# Patient Record
Sex: FEMALE | Race: OTHER | Employment: PART TIME | URBAN - METROPOLITAN AREA
[De-identification: names, ages, dates, MRNs, and addresses within clinical notes are randomized per-mention and may not be internally consistent; named-entity substitution may affect disease eponyms.]

---

## 2024-09-23 ENCOUNTER — APPOINTMENT (OUTPATIENT)
Dept: CT IMAGING | Age: 54
DRG: 390 | End: 2024-09-23
Payer: MEDICAID

## 2024-09-23 ENCOUNTER — HOSPITAL ENCOUNTER (INPATIENT)
Age: 54
LOS: 1 days | Discharge: HOME | DRG: 390 | End: 2024-09-25
Attending: STUDENT IN AN ORGANIZED HEALTH CARE EDUCATION/TRAINING PROGRAM | Admitting: STUDENT IN AN ORGANIZED HEALTH CARE EDUCATION/TRAINING PROGRAM
Payer: MEDICAID

## 2024-09-23 DIAGNOSIS — K52.9 ILEITIS: ICD-10-CM

## 2024-09-23 DIAGNOSIS — K56.609 SMALL BOWEL OBSTRUCTION (HCC): Primary | ICD-10-CM

## 2024-09-23 LAB
ALBUMIN SERPL-MCNC: 4.6 G/DL (ref 3.4–5)
ALBUMIN/GLOB SERPL: 1.4 {RATIO} (ref 1.1–2.2)
ALP SERPL-CCNC: 121 U/L (ref 40–129)
ALT SERPL-CCNC: 17 U/L (ref 10–40)
ANION GAP SERPL CALCULATED.3IONS-SCNC: 13 MMOL/L (ref 3–16)
AST SERPL-CCNC: 21 U/L (ref 15–37)
BACTERIA URNS QL MICRO: NORMAL /HPF
BASOPHILS # BLD: 0 K/UL (ref 0–0.2)
BASOPHILS NFR BLD: 0 %
BILIRUB SERPL-MCNC: 0.3 MG/DL (ref 0–1)
BILIRUB UR QL STRIP.AUTO: NEGATIVE
BUN SERPL-MCNC: 12 MG/DL (ref 7–20)
CALCIUM SERPL-MCNC: 9.6 MG/DL (ref 8.3–10.6)
CHLORIDE SERPL-SCNC: 105 MMOL/L (ref 99–110)
CLARITY UR: CLEAR
CO2 SERPL-SCNC: 25 MMOL/L (ref 21–32)
COLOR UR: YELLOW
CREAT SERPL-MCNC: <0.5 MG/DL (ref 0.6–1.1)
DEPRECATED RDW RBC AUTO: 12.7 % (ref 12.4–15.4)
EOSINOPHIL # BLD: 0 K/UL (ref 0–0.6)
EOSINOPHIL NFR BLD: 0 %
EPI CELLS #/AREA URNS AUTO: 0 /HPF (ref 0–5)
GFR SERPLBLD CREATININE-BSD FMLA CKD-EPI: >90 ML/MIN/{1.73_M2}
GLUCOSE SERPL-MCNC: 125 MG/DL (ref 70–99)
GLUCOSE UR STRIP.AUTO-MCNC: NEGATIVE MG/DL
HCG SERPL QL: NEGATIVE
HCT VFR BLD AUTO: 39.6 % (ref 36–48)
HGB BLD-MCNC: 13.7 G/DL (ref 12–16)
HGB UR QL STRIP.AUTO: NEGATIVE
HYALINE CASTS #/AREA URNS AUTO: 0 /LPF (ref 0–8)
KETONES UR STRIP.AUTO-MCNC: NEGATIVE MG/DL
LACTATE BLDV-SCNC: 1.4 MMOL/L (ref 0.4–1.9)
LEUKOCYTE ESTERASE UR QL STRIP.AUTO: ABNORMAL
LIPASE SERPL-CCNC: 28 U/L (ref 13–60)
LYMPHOCYTES # BLD: 5.4 K/UL (ref 1–5.1)
LYMPHOCYTES NFR BLD: 55 %
MCH RBC QN AUTO: 29.1 PG (ref 26–34)
MCHC RBC AUTO-ENTMCNC: 34.6 G/DL (ref 31–36)
MCV RBC AUTO: 83.9 FL (ref 80–100)
MONOCYTES # BLD: 0 K/UL (ref 0–1.3)
MONOCYTES NFR BLD: 0 %
NEUTROPHILS # BLD: 4.4 K/UL (ref 1.7–7.7)
NEUTROPHILS NFR BLD: 45 %
NITRITE UR QL STRIP.AUTO: NEGATIVE
PATH INTERP BLD-IMP: YES
PH UR STRIP.AUTO: 8 [PH] (ref 5–8)
PLATELET # BLD AUTO: 297 K/UL (ref 135–450)
PLATELET BLD QL SMEAR: ADEQUATE
PMV BLD AUTO: 7.9 FL (ref 5–10.5)
POTASSIUM SERPL-SCNC: 3.7 MMOL/L (ref 3.5–5.1)
PROT SERPL-MCNC: 8 G/DL (ref 6.4–8.2)
PROT UR STRIP.AUTO-MCNC: NEGATIVE MG/DL
RBC # BLD AUTO: 4.71 M/UL (ref 4–5.2)
RBC CLUMPS #/AREA URNS AUTO: 0 /HPF (ref 0–4)
RBC MORPH BLD: NORMAL
SLIDE REVIEW: ABNORMAL
SODIUM SERPL-SCNC: 143 MMOL/L (ref 136–145)
SP GR UR STRIP.AUTO: <=1.005 (ref 1–1.03)
UA COMPLETE W REFLEX CULTURE PNL UR: ABNORMAL
UA DIPSTICK W REFLEX MICRO PNL UR: YES
URN SPEC COLLECT METH UR: ABNORMAL
UROBILINOGEN UR STRIP-ACNC: 0.2 E.U./DL
WBC # BLD AUTO: 9.8 K/UL (ref 4–11)
WBC #/AREA URNS AUTO: 1 /HPF (ref 0–5)

## 2024-09-23 PROCEDURE — 81001 URINALYSIS AUTO W/SCOPE: CPT

## 2024-09-23 PROCEDURE — 2580000003 HC RX 258: Performed by: PHYSICIAN ASSISTANT

## 2024-09-23 PROCEDURE — 99285 EMERGENCY DEPT VISIT HI MDM: CPT

## 2024-09-23 PROCEDURE — 96375 TX/PRO/DX INJ NEW DRUG ADDON: CPT

## 2024-09-23 PROCEDURE — 6360000002 HC RX W HCPCS: Performed by: PHYSICIAN ASSISTANT

## 2024-09-23 PROCEDURE — 84703 CHORIONIC GONADOTROPIN ASSAY: CPT

## 2024-09-23 PROCEDURE — 36415 COLL VENOUS BLD VENIPUNCTURE: CPT

## 2024-09-23 PROCEDURE — 6360000004 HC RX CONTRAST MEDICATION: Performed by: STUDENT IN AN ORGANIZED HEALTH CARE EDUCATION/TRAINING PROGRAM

## 2024-09-23 PROCEDURE — 83605 ASSAY OF LACTIC ACID: CPT

## 2024-09-23 PROCEDURE — 74177 CT ABD & PELVIS W/CONTRAST: CPT

## 2024-09-23 PROCEDURE — 85025 COMPLETE CBC W/AUTO DIFF WBC: CPT

## 2024-09-23 PROCEDURE — 83690 ASSAY OF LIPASE: CPT

## 2024-09-23 PROCEDURE — 80053 COMPREHEN METABOLIC PANEL: CPT

## 2024-09-23 PROCEDURE — 96374 THER/PROPH/DIAG INJ IV PUSH: CPT

## 2024-09-23 PROCEDURE — 96361 HYDRATE IV INFUSION ADD-ON: CPT

## 2024-09-23 RX ORDER — IOPAMIDOL 755 MG/ML
75 INJECTION, SOLUTION INTRAVASCULAR
Status: COMPLETED | OUTPATIENT
Start: 2024-09-23 | End: 2024-09-23

## 2024-09-23 RX ORDER — CYCLOBENZAPRINE HCL 10 MG
5 TABLET ORAL NIGHTLY PRN
Status: ON HOLD | COMMUNITY
Start: 2024-08-20 | End: 2024-09-25 | Stop reason: HOSPADM

## 2024-09-23 RX ORDER — NAPROXEN SODIUM 220 MG
220 TABLET ORAL 2 TIMES DAILY WITH MEALS
Status: ON HOLD | COMMUNITY
End: 2024-09-25 | Stop reason: HOSPADM

## 2024-09-23 RX ORDER — MORPHINE SULFATE 4 MG/ML
4 INJECTION, SOLUTION INTRAMUSCULAR; INTRAVENOUS EVERY 30 MIN PRN
Status: DISCONTINUED | OUTPATIENT
Start: 2024-09-23 | End: 2024-09-24 | Stop reason: HOSPADM

## 2024-09-23 RX ORDER — ESTRADIOL 0.1 MG/G
CREAM VAGINAL
COMMUNITY
Start: 2024-05-28

## 2024-09-23 RX ORDER — KETOROLAC TROMETHAMINE 15 MG/ML
15 INJECTION, SOLUTION INTRAMUSCULAR; INTRAVENOUS ONCE
Status: COMPLETED | OUTPATIENT
Start: 2024-09-23 | End: 2024-09-23

## 2024-09-23 RX ORDER — OLOPATADINE HYDROCHLORIDE 2 MG/ML
1 SOLUTION/ DROPS OPHTHALMIC 2 TIMES DAILY
COMMUNITY
Start: 2022-06-24

## 2024-09-23 RX ORDER — ONDANSETRON 2 MG/ML
4 INJECTION INTRAMUSCULAR; INTRAVENOUS EVERY 6 HOURS PRN
Status: DISCONTINUED | OUTPATIENT
Start: 2024-09-23 | End: 2024-09-24 | Stop reason: HOSPADM

## 2024-09-23 RX ORDER — 0.9 % SODIUM CHLORIDE 0.9 %
1000 INTRAVENOUS SOLUTION INTRAVENOUS ONCE
Status: COMPLETED | OUTPATIENT
Start: 2024-09-23 | End: 2024-09-23

## 2024-09-23 RX ADMIN — SODIUM CHLORIDE 1000 ML: 9 INJECTION, SOLUTION INTRAVENOUS at 22:19

## 2024-09-23 RX ADMIN — ONDANSETRON 4 MG: 2 INJECTION, SOLUTION INTRAMUSCULAR; INTRAVENOUS at 22:18

## 2024-09-23 RX ADMIN — KETOROLAC TROMETHAMINE 15 MG: 15 INJECTION, SOLUTION INTRAMUSCULAR; INTRAVENOUS at 22:18

## 2024-09-23 RX ADMIN — IOPAMIDOL 75 ML: 755 INJECTION, SOLUTION INTRAVENOUS at 22:59

## 2024-09-23 ASSESSMENT — ENCOUNTER SYMPTOMS
DIARRHEA: 1
ABDOMINAL PAIN: 1
COUGH: 0
VOMITING: 0
RHINORRHEA: 0
NAUSEA: 1
SHORTNESS OF BREATH: 0

## 2024-09-23 ASSESSMENT — LIFESTYLE VARIABLES
HOW OFTEN DO YOU HAVE A DRINK CONTAINING ALCOHOL: NEVER
HOW MANY STANDARD DRINKS CONTAINING ALCOHOL DO YOU HAVE ON A TYPICAL DAY: PATIENT DOES NOT DRINK

## 2024-09-23 ASSESSMENT — PAIN SCALES - GENERAL
PAINLEVEL_OUTOF10: 6
PAINLEVEL_OUTOF10: 10
PAINLEVEL_OUTOF10: 10

## 2024-09-23 ASSESSMENT — PAIN DESCRIPTION - LOCATION: LOCATION: ABDOMEN

## 2024-09-23 ASSESSMENT — PAIN - FUNCTIONAL ASSESSMENT: PAIN_FUNCTIONAL_ASSESSMENT: 0-10

## 2024-09-24 ENCOUNTER — APPOINTMENT (OUTPATIENT)
Dept: GENERAL RADIOLOGY | Age: 54
DRG: 390 | End: 2024-09-24
Payer: MEDICAID

## 2024-09-24 PROBLEM — R10.9 ABDOMINAL PAIN: Status: ACTIVE | Noted: 2024-09-24

## 2024-09-24 PROBLEM — K56.609 SMALL BOWEL OBSTRUCTION (HCC): Status: ACTIVE | Noted: 2024-09-24

## 2024-09-24 LAB
ANION GAP SERPL CALCULATED.3IONS-SCNC: 11 MMOL/L (ref 3–16)
BUN SERPL-MCNC: 8 MG/DL (ref 7–20)
CALCIUM SERPL-MCNC: 8.6 MG/DL (ref 8.3–10.6)
CHLORIDE SERPL-SCNC: 106 MMOL/L (ref 99–110)
CO2 SERPL-SCNC: 23 MMOL/L (ref 21–32)
CREAT SERPL-MCNC: <0.5 MG/DL (ref 0.6–1.1)
GFR SERPLBLD CREATININE-BSD FMLA CKD-EPI: >90 ML/MIN/{1.73_M2}
GLUCOSE SERPL-MCNC: 148 MG/DL (ref 70–99)
PATH INTERP BLD-IMP: NORMAL
POTASSIUM SERPL-SCNC: 3.8 MMOL/L (ref 3.5–5.1)
SODIUM SERPL-SCNC: 140 MMOL/L (ref 136–145)

## 2024-09-24 PROCEDURE — 6370000000 HC RX 637 (ALT 250 FOR IP): Performed by: STUDENT IN AN ORGANIZED HEALTH CARE EDUCATION/TRAINING PROGRAM

## 2024-09-24 PROCEDURE — 1200000000 HC SEMI PRIVATE

## 2024-09-24 PROCEDURE — 6360000004 HC RX CONTRAST MEDICATION

## 2024-09-24 PROCEDURE — 2580000003 HC RX 258: Performed by: STUDENT IN AN ORGANIZED HEALTH CARE EDUCATION/TRAINING PROGRAM

## 2024-09-24 PROCEDURE — 99222 1ST HOSP IP/OBS MODERATE 55: CPT | Performed by: SURGERY

## 2024-09-24 PROCEDURE — 6360000002 HC RX W HCPCS: Performed by: PHYSICIAN ASSISTANT

## 2024-09-24 PROCEDURE — APPNB30 APP NON BILLABLE TIME 0-30 MINS: Performed by: NURSE PRACTITIONER

## 2024-09-24 PROCEDURE — 6360000002 HC RX W HCPCS: Performed by: STUDENT IN AN ORGANIZED HEALTH CARE EDUCATION/TRAINING PROGRAM

## 2024-09-24 PROCEDURE — 6360000002 HC RX W HCPCS: Performed by: SURGERY

## 2024-09-24 PROCEDURE — 36415 COLL VENOUS BLD VENIPUNCTURE: CPT

## 2024-09-24 PROCEDURE — 80048 BASIC METABOLIC PNL TOTAL CA: CPT

## 2024-09-24 PROCEDURE — 74250 X-RAY XM SM INT 1CNTRST STD: CPT

## 2024-09-24 PROCEDURE — 74018 RADEX ABDOMEN 1 VIEW: CPT

## 2024-09-24 RX ORDER — MAGNESIUM SULFATE IN WATER 40 MG/ML
2000 INJECTION, SOLUTION INTRAVENOUS PRN
Status: DISCONTINUED | OUTPATIENT
Start: 2024-09-24 | End: 2024-09-25 | Stop reason: HOSPADM

## 2024-09-24 RX ORDER — ONDANSETRON 4 MG/1
4 TABLET, ORALLY DISINTEGRATING ORAL EVERY 8 HOURS PRN
Status: DISCONTINUED | OUTPATIENT
Start: 2024-09-24 | End: 2024-09-25 | Stop reason: HOSPADM

## 2024-09-24 RX ORDER — SODIUM CHLORIDE 9 MG/ML
INJECTION, SOLUTION INTRAVENOUS CONTINUOUS
Status: DISCONTINUED | OUTPATIENT
Start: 2024-09-24 | End: 2024-09-25 | Stop reason: HOSPADM

## 2024-09-24 RX ORDER — ACETAMINOPHEN 325 MG/1
650 TABLET ORAL EVERY 6 HOURS PRN
Status: DISCONTINUED | OUTPATIENT
Start: 2024-09-24 | End: 2024-09-25 | Stop reason: HOSPADM

## 2024-09-24 RX ORDER — POTASSIUM CHLORIDE 1500 MG/1
40 TABLET, EXTENDED RELEASE ORAL PRN
Status: DISCONTINUED | OUTPATIENT
Start: 2024-09-24 | End: 2024-09-25 | Stop reason: HOSPADM

## 2024-09-24 RX ORDER — LEVOFLOXACIN 5 MG/ML
500 INJECTION, SOLUTION INTRAVENOUS EVERY 24 HOURS
Status: DISCONTINUED | OUTPATIENT
Start: 2024-09-24 | End: 2024-09-25 | Stop reason: HOSPADM

## 2024-09-24 RX ORDER — MORPHINE SULFATE 2 MG/ML
2 INJECTION, SOLUTION INTRAMUSCULAR; INTRAVENOUS EVERY 4 HOURS PRN
Status: DISCONTINUED | OUTPATIENT
Start: 2024-09-24 | End: 2024-09-25 | Stop reason: HOSPADM

## 2024-09-24 RX ORDER — ONDANSETRON 2 MG/ML
4 INJECTION INTRAMUSCULAR; INTRAVENOUS EVERY 6 HOURS PRN
Status: DISCONTINUED | OUTPATIENT
Start: 2024-09-24 | End: 2024-09-25 | Stop reason: HOSPADM

## 2024-09-24 RX ORDER — ENOXAPARIN SODIUM 100 MG/ML
40 INJECTION SUBCUTANEOUS DAILY
Status: DISCONTINUED | OUTPATIENT
Start: 2024-09-24 | End: 2024-09-25 | Stop reason: HOSPADM

## 2024-09-24 RX ORDER — METRONIDAZOLE 500 MG/100ML
500 INJECTION, SOLUTION INTRAVENOUS EVERY 8 HOURS
Status: DISCONTINUED | OUTPATIENT
Start: 2024-09-24 | End: 2024-09-25 | Stop reason: HOSPADM

## 2024-09-24 RX ORDER — CIPROFLOXACIN 2 MG/ML
400 INJECTION, SOLUTION INTRAVENOUS EVERY 12 HOURS
Status: DISCONTINUED | OUTPATIENT
Start: 2024-09-24 | End: 2024-09-24 | Stop reason: RX

## 2024-09-24 RX ORDER — SODIUM CHLORIDE 9 MG/ML
INJECTION, SOLUTION INTRAVENOUS PRN
Status: DISCONTINUED | OUTPATIENT
Start: 2024-09-24 | End: 2024-09-25 | Stop reason: HOSPADM

## 2024-09-24 RX ORDER — POLYETHYLENE GLYCOL 3350 17 G/17G
17 POWDER, FOR SOLUTION ORAL DAILY PRN
Status: DISCONTINUED | OUTPATIENT
Start: 2024-09-24 | End: 2024-09-25 | Stop reason: HOSPADM

## 2024-09-24 RX ORDER — POTASSIUM CHLORIDE 7.45 MG/ML
10 INJECTION INTRAVENOUS PRN
Status: DISCONTINUED | OUTPATIENT
Start: 2024-09-24 | End: 2024-09-25 | Stop reason: HOSPADM

## 2024-09-24 RX ORDER — SODIUM CHLORIDE 0.9 % (FLUSH) 0.9 %
5-40 SYRINGE (ML) INJECTION PRN
Status: DISCONTINUED | OUTPATIENT
Start: 2024-09-24 | End: 2024-09-25 | Stop reason: HOSPADM

## 2024-09-24 RX ORDER — SODIUM CHLORIDE 0.9 % (FLUSH) 0.9 %
5-40 SYRINGE (ML) INJECTION EVERY 12 HOURS SCHEDULED
Status: DISCONTINUED | OUTPATIENT
Start: 2024-09-24 | End: 2024-09-25 | Stop reason: HOSPADM

## 2024-09-24 RX ORDER — ACETAMINOPHEN 650 MG/1
650 SUPPOSITORY RECTAL EVERY 6 HOURS PRN
Status: DISCONTINUED | OUTPATIENT
Start: 2024-09-24 | End: 2024-09-25 | Stop reason: HOSPADM

## 2024-09-24 RX ADMIN — SODIUM CHLORIDE, PRESERVATIVE FREE 10 ML: 5 INJECTION INTRAVENOUS at 20:04

## 2024-09-24 RX ADMIN — MORPHINE SULFATE 4 MG: 4 INJECTION, SOLUTION INTRAMUSCULAR; INTRAVENOUS at 00:47

## 2024-09-24 RX ADMIN — ACETAMINOPHEN 650 MG: 325 TABLET ORAL at 14:26

## 2024-09-24 RX ADMIN — SODIUM CHLORIDE: 9 INJECTION, SOLUTION INTRAVENOUS at 20:04

## 2024-09-24 RX ADMIN — SODIUM CHLORIDE: 9 INJECTION, SOLUTION INTRAVENOUS at 02:51

## 2024-09-24 RX ADMIN — METRONIDAZOLE 500 MG: 500 INJECTION, SOLUTION INTRAVENOUS at 12:39

## 2024-09-24 RX ADMIN — LEVOFLOXACIN 500 MG: 5 INJECTION, SOLUTION INTRAVENOUS at 12:39

## 2024-09-24 RX ADMIN — IOHEXOL 200 ML: 350 INJECTION, SOLUTION INTRAVENOUS at 11:27

## 2024-09-24 RX ADMIN — METRONIDAZOLE 500 MG: 500 INJECTION, SOLUTION INTRAVENOUS at 20:05

## 2024-09-24 RX ADMIN — ENOXAPARIN SODIUM 40 MG: 100 INJECTION SUBCUTANEOUS at 09:23

## 2024-09-24 RX ADMIN — MORPHINE SULFATE 2 MG: 2 INJECTION, SOLUTION INTRAMUSCULAR; INTRAVENOUS at 09:22

## 2024-09-24 ASSESSMENT — PAIN SCALES - GENERAL
PAINLEVEL_OUTOF10: 2
PAINLEVEL_OUTOF10: 8
PAINLEVEL_OUTOF10: 8
PAINLEVEL_OUTOF10: 5
PAINLEVEL_OUTOF10: 2
PAINLEVEL_OUTOF10: 8
PAINLEVEL_OUTOF10: 3
PAINLEVEL_OUTOF10: 7

## 2024-09-24 ASSESSMENT — PAIN DESCRIPTION - LOCATION
LOCATION: BACK;HEAD
LOCATION: BACK
LOCATION: HEAD
LOCATION: HEAD

## 2024-09-24 ASSESSMENT — PAIN DESCRIPTION - DESCRIPTORS
DESCRIPTORS: ACHING

## 2024-09-24 NOTE — ED PROVIDER NOTES
[facility] EMERGENCY DEPARTMENT  EMERGENCY DEPARTMENT ENCOUNTER        Pt Name: Tiana Silva  MRN: [id number]  Birthdate 1970  Date of evaluation: 9/23/2024  Provider: Maye Petersen PA-C  PCP: No primary care provider on file.  Note Started: 10:47 PM EDT 9/23/24       I have seen and evaluated this patient with my supervising physician Roberto Tello DO.      CHIEF COMPLAINT       Chief Complaint   Patient presents with    Abdominal Pain     Pt presents with abdominla pain that begna this morning the pain has gotten worse and 1 hour ago the pain became unbearable. Pt states she has diarrhea but denies n/v       HISTORY OF PRESENT ILLNESS: 1 or more Elements     History From: Patient, daughter at bedside  Limitations to history : Language Gibraltarian, family member interprets for the patient    Tiana Silva is a 54 y.o. female who presents to the emergency department today for evaluation for concerns of lower abdominal pain.  Patient reports that her symptoms began around 8 AM this morning, it is across her lower abdomen.  Over the past hour the patient reports that the pain has become sharp, constant, and is currently rated as a 6/10.  Patient reports that she has had 2 episodes of diarrhea however she denies any blood in the stool or black tarry appearance of the stool.  She is nauseous although denies any vomiting.  She denies any fever or chills.  She is no cough or congestion.  She is no chest pain or shortness of breath.  She denies any past surgical history to the abdomen, she states that she has never had pain like this in the past.  No other Complaints    Nursing Notes were all reviewed and agreed with or any disagreements were addressed in the HPI.    REVIEW OF SYSTEMS :      Review of Systems   Constitutional:  Negative for activity change, appetite change, chills and fever.   HENT:  Negative for congestion and rhinorrhea.    Respiratory:  Negative for cough and shortness  09/24/24 0105   Mon Sep 23, 2024   2239 WBC: 9.8 [PB]   2239 Hemoglobin Quant: 13.7 [PB]   2239 Leukocyte Esterase, Urine(!): TRACE [PB]   2239 WBC, UA: 1 [PB]   2239 Bacteria, UA: None Seen [PB]   2256 Creatinine(!): <0.5 [PB]   2257 Alkaline Phosphatase: 121 [PB]   2257 Lactic Acid, Sepsis: 1.4 [PB]   2257 Lipase: 28.0 [PB]   Tue Sep 24, 2024   0013 CT ABDOMEN PELVIS W IV CONTRAST Additional Contrast? None  Consistent with small bowel obstruction.  Will place NG tube and plan for admission. [PB]   0023 Patient is visited at bedside.  Remains stable condition.  Still complaining of some pain in the suprapubic region, but overall improved.  Discussed results and plan for admission to the hospital for SBO.  Patient verbalized understanding and agreement with plan. [PB]   0026 Order for consult inpatient hospitalist placed.  Message sent to inpatient team via Pocket. [PB]      ED Course User Index  [PB] Roberto Tello, DO            Is this patient to be included in the SEP-1 Core Measure due to severe sepsis or septic shock?   No   Exclusion criteria - the patient is NOT to be included for SEP-1 Core Measure due to:  Infection is not suspected    Chronic Conditions affecting care:  has no past medical history on file.    CONSULTS: (Who and What was discussed)  IP CONSULT TO HOSPITALIST  IP CONSULT TO GENERAL SURGERY      Social Determinants Significantly Affecting Health : Language barrier,     Records Reviewed (External and Source) previous visits from [facility] facility    CC/HPI Summary, DDx, ED Course, and Reassessment: Briefly, this is a 54-year-old female who presents to the emergency department today for evaluation for concerns of lower abdominal pain which began this morning.  Worsened partially 1 hour ago.  Associated nausea and diarrhea.    On examination her vital signs are stable.  She is tenderness noted to the periumbilical abdomen, left lower abdomen.  No rebound or guarding.    Disposition

## 2024-09-24 NOTE — CONSULTS
Nutrition Note    RECOMMENDATIONS  PO Diet: NPO.  When diet advanced recommend CCC-4, low fiber  Weight: obtain current wt & document method used to obtain    ASSESSMENT   Consult received for unintentional weight loss. Pt admitted with abdominal pain and c/o nausea.  Currently NPO with NG suction.  Spoke with pt's daughter who indicated that pt was not aware of any wt loss until she recently went to physician's office.  Per chart review of office records, pt was 139 lb 8/20 and 135 lb 9/17, indicating an insignificant loss of 3% in 1 month.  Admission wt appears to be 135 lb, unsure of method used to obtain wt.  Recommend to obtain new weight.  Home PO intake usually good, no report of poor appetite, pt has been following provider at physician's office for education on prediabetes.  Will follow for initiation of nutrition.       Malnutrition Status: No malnutrition  Acute Illness      NUTRITION DIAGNOSIS   Inadequate oral intake related to altered GI function as evidenced by NPO or clear liquid status due to medical condition    Goals: initiate nutrition within next 48 hr    NUTRITION RELATED FINDINGS  Objective: NS at 75 mL/hr; NG suction  Wounds: None    CURRENT NUTRITION THERAPIES  Diet NPO Exceptions are: Ice Chips     PO Intake: NPO   PO Supplement Intake:NPO      ANTHROPOMETRICS  Current Height: 154.9 cm (5' 0.98\")  Current Weight - Scale: 61.2 kg (135 lb)    Ideal Body Weight (IBW): 105 lbs  (48 kg)    Usual Bodyweight 63 kg (139 lb)       BMI: 25.5      COMPARATIVE STANDARDS  Total Energy Requirements (kcals/day): 1596     Protein (g):  61 - 73       Fluid (mL/day):  1596    EDUCATION  Education not indicated     The patient will be monitored per nutrition standards of care. Consult dietitian if additional nutrition interventions are needed prior to RD reassessment.     JESÚS WHITLEY, RUBEN, LD    Contact: [phone]

## 2024-09-24 NOTE — CARE COORDINATION
Case management is following peripherally for discharge planning. The chart was reviewed. Ms. Seay is from home with her family. She is independent with self care and functional mobility. She has primary care at the [facility]. It is anticipated she will be able to return home. Insurance information is not available on the chart. May need Meds-to-Beds and a voucher. Will reassess when ready for discharge.    Shanthi Mckinney, MSN RN-BC [facility]-BC ACM-RN  [phone]

## 2024-09-24 NOTE — CONSULTS
[facility] GENERAL AND LAPAROSCOPIC SURGERY                       PATIENT NAME: Tiana Silva     ADMISSION DATE: 9/23/2024  9:44 PM      TODAY'S DATE: 9/24/2024    Reason for Consult:  Abd pain    Requesting Physician:  Dr. SOFY Christianson    HISTORY OF PRESENT ILLNESS:              The patient is a 54 y.o. female who presents with abd pain, lower when presented, now complaints of more focal upper abd pain, moderate, more with pressure. Present 2 days, no prior similar issues. Has had nausea, and had diarrhea prior to admit. No known chronic GI disorders.    Past Medical History:    History reviewed. No pertinent past medical history.    Past Surgical History:    History reviewed. No pertinent surgical history.    Current Medications:   Current Facility-Administered Medications: sodium chloride flush 0.9 % injection 5-40 mL, 5-40 mL, IntraVENous, 2 times per day  sodium chloride flush 0.9 % injection 5-40 mL, 5-40 mL, IntraVENous, PRN  0.9 % sodium chloride infusion, , IntraVENous, PRN  potassium chloride (KLOR-CON M) extended release tablet 40 mEq, 40 mEq, Oral, PRN **OR** potassium bicarb-citric acid (EFFER-K) effervescent tablet 40 mEq, 40 mEq, Oral, PRN **OR** potassium chloride 10 mEq/100 mL IVPB (Peripheral Line), 10 mEq, IntraVENous, PRN  magnesium sulfate 2000 mg in 50 mL IVPB premix, 2,000 mg, IntraVENous, PRN  enoxaparin (LOVENOX) injection 40 mg, 40 mg, SubCUTAneous, Daily  ondansetron (ZOFRAN-ODT) disintegrating tablet 4 mg, 4 mg, Oral, Q8H PRN **OR** ondansetron (ZOFRAN) injection 4 mg, 4 mg, IntraVENous, Q6H PRN  polyethylene glycol (GLYCOLAX) packet 17 g, 17 g, Oral, Daily PRN  acetaminophen (TYLENOL) tablet 650 mg, 650 mg, Oral, Q6H PRN **OR** acetaminophen (TYLENOL) suppository 650 mg, 650 mg, Rectal, Q6H PRN  0.9 % sodium chloride infusion, , IntraVENous, Continuous  morphine (PF) injection 2 mg, 2 mg, IntraVENous, Q4H PRN  Prior to Admission medications    Medication Sig Start Date End

## 2024-09-24 NOTE — PLAN OF CARE
Problem: Discharge Planning  Goal: Discharge to home or other facility with appropriate resources  9/24/2024 1211 by Lexus Du, RN  Outcome: Progressing     Problem: Pain  Goal: Verbalizes/displays adequate comfort level or baseline comfort level  9/24/2024 1211 by Lexus Du, RN  Outcome: Progressing     Problem: Nutrition Deficit:  Goal: Optimize nutritional status  Outcome: Progressing

## 2024-09-24 NOTE — H&P
V2.0  History and Physical      Name:  Tiana Silva /Age/Sex: 1970  (54 y.o. female)   MRN & CSN:  [id number] & [id number] Encounter Date/Time: 2024 12:38 AM EDT   Location:  [facility]4468/4468-01 PCP: No primary care provider on file.       Hospital Day: 2    Assessment and Plan:   Tiana Silva is a 54 y.o. female with no significant past medical history who presents with Abdominal pain    Hospital Problems             Last Modified POA    * (Principal) Abdominal pain 2024 Yes       Plan:  # Abdominal pain:  # Small bowel obstruction:  -Patient presented with chief complaint of abdominal pain.  Associated with nausea.   -In the ED, patient was hemodynamically stable.  -CT abdomen pelvis was done showed Mild circumferential mucosal thickening involving several loops of distal small bowel along the lower pelvis which could be due to focal ileitis with minimal surrounding inflammation and fluid in the area.  This appears to be causing a mild partial small bowel obstruction.    -NG tube  -Pain control  -Antiemetic  -General Surgery consult    Disposition:   Current Living situation: Home  Expected Disposition: Home  Estimated D/C: 2 to 3 days    Diet No diet orders on file   DVT Prophylaxis [x] Lovenox, []  Heparin, [] SCDs, [] Ambulation,  [] Eliquis, [] Xarelto, [] Coumadin   Code Status No Order   Surrogate Decision Maker/ POA      Personally reviewed Lab Studies and Imaging     Discussed management of the case with ED attending who recommended to admit patient for abdominal pain.    EKG interpreted personally and results no ST elevation.    Imaging that was interpreted personally includes CT abdomen pelvis and results as above.      History from:     patient    History of Present Illness:     Chief Complaint: Abdominal pain  Tiana Silva is a 54 y.o. female with no significant past medical history who presents with chief complaint of abdominal pain.    Patient mentioned that   09/23/24 2206      K 3.7      CO2 25   BUN 12   CREATININE <0.5*   GLUCOSE 125*     Hepatic:   Recent Labs     09/23/24 2206   AST 21   ALT 17   BILITOT 0.3   ALKPHOS 121     Lipids: No results found for: \"CHOL\", \"HDL\", \"TRIG\"  Hemoglobin A1C: No results found for: \"LABA1C\"  TSH: No results found for: \"TSH\"  Troponin: No results found for: \"TROPONINT\"  Lactic Acid: No results for input(s): \"LACTA\" in the last 72 hours.  BNP: No results for input(s): \"PROBNP\" in the last 72 hours.  UA:  Lab Results   Component Value Date/Time    NITRU Negative 09/23/2024 10:10 PM    COLORU Yellow 09/23/2024 10:10 PM    PHUR 8.0 09/23/2024 10:10 PM    WBCUA 1 09/23/2024 10:10 PM    RBCUA 0 09/23/2024 10:10 PM    BACTERIA None Seen 09/23/2024 10:10 PM    CLARITYU Clear 09/23/2024 10:10 PM    LEUKOCYTESUR TRACE 09/23/2024 10:10 PM    UROBILINOGEN 0.2 09/23/2024 10:10 PM    BILIRUBINUR Negative 09/23/2024 10:10 PM    BLOODU Negative 09/23/2024 10:10 PM    GLUCOSEU Negative 09/23/2024 10:10 PM    KETUA Negative 09/23/2024 10:10 PM     Urine Cultures: No results found for: \"LABURIN\"  Blood Cultures: No results found for: \"BC\"  No results found for: \"BLOODCULT2\"  Organism: No results found for: \"ORG\"    Imaging/Diagnostics Last 24 Hours   CT ABDOMEN PELVIS W IV CONTRAST Additional Contrast? None    Result Date: 9/24/2024  EXAMINATION: CT OF THE ABDOMEN AND PELVIS WITH CONTRAST 9/23/2024 10:53 pm TECHNIQUE: CT of the abdomen and pelvis was performed with the administration of intravenous contrast. Multiplanar reformatted images are provided for review. Automated exposure control, iterative reconstruction, and/or weight based adjustment of the mA/kV was utilized to reduce the radiation dose to as low as reasonably achievable. COMPARISON: None. HISTORY: ORDERING SYSTEM PROVIDED HISTORY: lowr abd pain TECHNOLOGIST PROVIDED HISTORY: Reason for exam:->lowr abd pain Additional Contrast?->None Decision Support Exception - unselect if

## 2024-09-24 NOTE — ED PROVIDER NOTES
EMERGENCY DEPARTMENT PROVIDER ATTESTATION NOTE      PATIENT IDENTIFICATION  Name:   Tiana Silva  MRN:   [id number]  YOB: 1970  Date of Evaluation:   9/23/2024  Provider:   ROSALINE Bender; Roberto Tello DO  PCP:   No primary care provider on file.        CHIEF COMPLAINT:   Abdominal Pain (Pt presents with abdominla pain that begna this morning the pain has gotten worse and 1 hour ago the pain became unbearable. Pt states she has diarrhea but denies n/v)      HPI  I independently interviewed patient and/or caretaker(s).  See Advanced Practice Provider (AYANA) note for full HPI.  In summary, Tiana Silva  is a(n) 54 y.o. female who presents with suprapubic abdominal discomfort ever since this morning.  Last bowel movement about 6 hours prior to arrival and was diarrhea        PHYSICAL EXAM  I performed an independent physical exam and agree with findings in AYANA note.  Overall well-appearing female with mild tenderness palpation of the suprapubic region.          LABS  Results for orders placed or performed during the hospital encounter of 09/23/24   CBC with Auto Differential   Result Value Ref Range    WBC 9.8 4.0 - 11.0 K/uL    RBC 4.71 4.00 - 5.20 M/uL    Hemoglobin 13.7 12.0 - 16.0 g/dL    Hematocrit 39.6 36.0 - 48.0 %    MCV 83.9 80.0 - 100.0 fL    MCH 29.1 26.0 - 34.0 pg    MCHC 34.6 31.0 - 36.0 g/dL    RDW 12.7 12.4 - 15.4 %    Platelets 297 135 - 450 K/uL    MPV 7.9 5.0 - 10.5 fL    PLATELET SLIDE REVIEW Adequate     SLIDE REVIEW see below     Path Consult Yes     Neutrophils % 45.0 %    Lymphocytes % 55.0 %    Monocytes % 0.0 %    Eosinophils % 0.0 %    Basophils % 0.0 %    Neutrophils Absolute 4.4 1.7 - 7.7 K/uL    Lymphocytes Absolute 5.4 (H) 1.0 - 5.1 K/uL    Monocytes Absolute 0.0 0.0 - 1.3 K/uL    Eosinophils Absolute 0.0 0.0 - 0.6 K/uL    Basophils Absolute 0.0 0.0 - 0.2 K/uL    RBC Morphology Normal    Comprehensive Metabolic Panel   Result Value Ref Range    Sodium    causing a mild partial small bowel obstruction.  Recommend surgical follow-up      Contracted gallbladder with no gallstones.      Mild chronic liver changes with fatty replacement throughout.      Atrophic uterus and mild free fluid in the cul-de-sac with no obvious abscess   or pelvic mass and a normal appendix.         XR ABDOMEN FOR NG/OG/NE TUBE PLACEMENT    (Results Pending)   Any applicable radiology studies including x-ray, CT, MRI, and/or ultrasound were reviewed independently by me in addition to the radiologist.  I reviewed all radiology images and reports as well from this evaluation.        MEDICAL DECISION-MAKING  I, Dr. Tello, participated in patient's care at the request of ROSALINE Bender.  Patient's history, physical exam, plan of care, and results were discussed.    Patient presented with Abdominal Pain (Pt presents with abdominla pain that begna this morning the pain has gotten worse and 1 hour ago the pain became unbearable. Pt states she has diarrhea but denies n/v)   as described above.  History obtained from patient and AYANA.  Prior medical history reviewed.  Initial vital signs reviewed and within normal limits.  Patient nontoxic appearing and stable.  My physical exam matches as documented in AYANA note.    Patient has been thoroughly evaluated for suprapubic abdominal pain by AYANA.  I reviewed workup performed by AYANA per ED course.  Pending CT abdomen/pelvis being read.  Disposition based on results.    I independently interpreted lab work (per ED course) and imaging (per ED course).    ED Course as of 09/24/24 0107   Mon Sep 23, 2024   2239 WBC: 9.8 [PB]   2239 Hemoglobin Quant: 13.7 [PB]   2239 Leukocyte Esterase, Urine(!): TRACE [PB]   2239 WBC, UA: 1 [PB]   2239 Bacteria, UA: None Seen [PB]   2256 Creatinine(!): <0.5 [PB]   2257 Alkaline Phosphatase: 121 [PB]   2257 Lactic Acid, Sepsis: 1.4 [PB]   2257 Lipase: 28.0 [PB]   Tue Sep 24, 2024   0013 CT ABDOMEN PELVIS W IV CONTRAST

## 2024-09-25 VITALS
TEMPERATURE: 97.7 F | OXYGEN SATURATION: 95 % | BODY MASS INDEX: 25.49 KG/M2 | HEART RATE: 86 BPM | DIASTOLIC BLOOD PRESSURE: 79 MMHG | WEIGHT: 135 LBS | RESPIRATION RATE: 16 BRPM | HEIGHT: 61 IN | SYSTOLIC BLOOD PRESSURE: 147 MMHG

## 2024-09-25 LAB
ANION GAP SERPL CALCULATED.3IONS-SCNC: 8 MMOL/L (ref 3–16)
BASOPHILS # BLD: 0 K/UL (ref 0–0.2)
BASOPHILS NFR BLD: 0.2 %
BUN SERPL-MCNC: 6 MG/DL (ref 7–20)
CALCIUM SERPL-MCNC: 8.7 MG/DL (ref 8.3–10.6)
CHLORIDE SERPL-SCNC: 112 MMOL/L (ref 99–110)
CO2 SERPL-SCNC: 25 MMOL/L (ref 21–32)
CREAT SERPL-MCNC: 0.5 MG/DL (ref 0.6–1.1)
DEPRECATED RDW RBC AUTO: 12.4 % (ref 12.4–15.4)
EOSINOPHIL # BLD: 0.1 K/UL (ref 0–0.6)
EOSINOPHIL NFR BLD: 1.2 %
GFR SERPLBLD CREATININE-BSD FMLA CKD-EPI: >90 ML/MIN/{1.73_M2}
GLUCOSE SERPL-MCNC: 102 MG/DL (ref 70–99)
HCT VFR BLD AUTO: 36.1 % (ref 36–48)
HGB BLD-MCNC: 12.1 G/DL (ref 12–16)
LYMPHOCYTES # BLD: 3.6 K/UL (ref 1–5.1)
LYMPHOCYTES NFR BLD: 37.6 %
MAGNESIUM SERPL-MCNC: 2 MG/DL (ref 1.8–2.4)
MCH RBC QN AUTO: 28.6 PG (ref 26–34)
MCHC RBC AUTO-ENTMCNC: 33.6 G/DL (ref 31–36)
MCV RBC AUTO: 85.2 FL (ref 80–100)
MONOCYTES # BLD: 0.7 K/UL (ref 0–1.3)
MONOCYTES NFR BLD: 7.5 %
NEUTROPHILS # BLD: 5.2 K/UL (ref 1.7–7.7)
NEUTROPHILS NFR BLD: 53.5 %
PLATELET # BLD AUTO: 262 K/UL (ref 135–450)
PMV BLD AUTO: 8.1 FL (ref 5–10.5)
POTASSIUM SERPL-SCNC: 3.4 MMOL/L (ref 3.5–5.1)
RBC # BLD AUTO: 4.23 M/UL (ref 4–5.2)
SODIUM SERPL-SCNC: 145 MMOL/L (ref 136–145)
WBC # BLD AUTO: 9.6 K/UL (ref 4–11)

## 2024-09-25 PROCEDURE — 85025 COMPLETE CBC W/AUTO DIFF WBC: CPT

## 2024-09-25 PROCEDURE — APPSS15 APP SPLIT SHARED TIME 0-15 MINUTES: Performed by: NURSE PRACTITIONER

## 2024-09-25 PROCEDURE — 6360000002 HC RX W HCPCS: Performed by: STUDENT IN AN ORGANIZED HEALTH CARE EDUCATION/TRAINING PROGRAM

## 2024-09-25 PROCEDURE — 6360000002 HC RX W HCPCS: Performed by: SURGERY

## 2024-09-25 PROCEDURE — 6370000000 HC RX 637 (ALT 250 FOR IP): Performed by: STUDENT IN AN ORGANIZED HEALTH CARE EDUCATION/TRAINING PROGRAM

## 2024-09-25 PROCEDURE — APPNB30 APP NON BILLABLE TIME 0-30 MINS: Performed by: NURSE PRACTITIONER

## 2024-09-25 PROCEDURE — 83735 ASSAY OF MAGNESIUM: CPT

## 2024-09-25 PROCEDURE — 99232 SBSQ HOSP IP/OBS MODERATE 35: CPT | Performed by: SURGERY

## 2024-09-25 PROCEDURE — 6370000000 HC RX 637 (ALT 250 FOR IP): Performed by: NURSE PRACTITIONER

## 2024-09-25 PROCEDURE — 80048 BASIC METABOLIC PNL TOTAL CA: CPT

## 2024-09-25 PROCEDURE — 36415 COLL VENOUS BLD VENIPUNCTURE: CPT

## 2024-09-25 RX ORDER — METRONIDAZOLE 500 MG/1
500 TABLET ORAL 3 TIMES DAILY
Qty: 15 TABLET | Refills: 0 | Status: SHIPPED | OUTPATIENT
Start: 2024-09-25 | End: 2024-09-30

## 2024-09-25 RX ORDER — CIPROFLOXACIN 500 MG/1
500 TABLET, FILM COATED ORAL 2 TIMES DAILY
Qty: 10 TABLET | Refills: 0 | Status: SHIPPED | OUTPATIENT
Start: 2024-09-25 | End: 2024-09-30

## 2024-09-25 RX ORDER — POTASSIUM CHLORIDE 1500 MG/1
40 TABLET, EXTENDED RELEASE ORAL ONCE
Status: COMPLETED | OUTPATIENT
Start: 2024-09-25 | End: 2024-09-25

## 2024-09-25 RX ADMIN — POTASSIUM CHLORIDE 40 MEQ: 1500 TABLET, EXTENDED RELEASE ORAL at 09:25

## 2024-09-25 RX ADMIN — ENOXAPARIN SODIUM 40 MG: 100 INJECTION SUBCUTANEOUS at 09:25

## 2024-09-25 RX ADMIN — METRONIDAZOLE 500 MG: 500 INJECTION, SOLUTION INTRAVENOUS at 03:59

## 2024-09-25 RX ADMIN — ACETAMINOPHEN 650 MG: 325 TABLET ORAL at 04:02

## 2024-09-25 ASSESSMENT — PAIN DESCRIPTION - DESCRIPTORS: DESCRIPTORS: ACHING

## 2024-09-25 ASSESSMENT — PAIN - FUNCTIONAL ASSESSMENT: PAIN_FUNCTIONAL_ASSESSMENT: ACTIVITIES ARE NOT PREVENTED

## 2024-09-25 ASSESSMENT — PAIN SCALES - GENERAL
PAINLEVEL_OUTOF10: 0
PAINLEVEL_OUTOF10: 5

## 2024-09-25 ASSESSMENT — PAIN DESCRIPTION - ORIENTATION: ORIENTATION: MID

## 2024-09-25 ASSESSMENT — PAIN DESCRIPTION - LOCATION: LOCATION: HEAD

## 2024-09-25 NOTE — PLAN OF CARE
Problem: Discharge Planning  Goal: Discharge to home or other facility with appropriate resources  9/25/2024 1052 by Lexus Du, RN  Outcome: Progressing     Problem: Pain  Goal: Verbalizes/displays adequate comfort level or baseline comfort level  9/25/2024 1052 by Lexus Du, RN  Outcome: Progressing     Problem: Nutrition Deficit:  Goal: Optimize nutritional status  9/25/2024 1052 by Lexus Du, RN  Outcome: Progressing

## 2024-09-25 NOTE — PLAN OF CARE
Problem: Discharge Planning  Goal: Discharge to home or other facility with appropriate resources  9/25/2024 1146 by Love Hackett, RN  Outcome: Completed  9/25/2024 1052 by Lexus Du RN  Outcome: Progressing     Problem: Pain  Goal: Verbalizes/displays adequate comfort level or baseline comfort level  9/25/2024 1146 by Love Hackett, RN  Outcome: Completed  9/25/2024 1052 by Lexus Du RN  Outcome: Progressing     Problem: Nutrition Deficit:  Goal: Optimize nutritional status  9/25/2024 1146 by Love Hackett, RN  Outcome: Completed  9/25/2024 1052 by Lexus Du, RN  Outcome: Progressing

## 2024-09-25 NOTE — PLAN OF CARE
Problem: Discharge Planning  Goal: Discharge to home or other facility with appropriate resources  9/24/2024 2138 by Jennifer Thao, RN  Outcome: Progressing     Problem: Pain  Goal: Verbalizes/displays adequate comfort level or baseline comfort level  9/24/2024 2138 by Jennifer Thao, RN  Outcome: Progressing     Problem: Nutrition Deficit:  Goal: Optimize nutritional status  9/24/2024 2138 by Jennifer Thao, RN  Outcome: Progressing

## 2024-09-25 NOTE — DISCHARGE SUMMARY
[facility] DISCHARGE SUMMARY    Patient Demographics    Patient. Tiana Silva  Date of Birth. 1970  MRN. [id number]     Primary care provider. No primary care provider on file.  (Tel: None)    Admit date: 9/23/2024    Discharge date 9/25/2024  Note Date: 9/25/2024     Reason for Hospitalization.   Chief Complaint   Patient presents with    Abdominal Pain     Pt presents with abdominla pain that begna this morning the pain has gotten worse and 1 hour ago the pain became unbearable. Pt states she has diarrhea but denies n/v         Significant Findings.   Principal Problem:    Abdominal pain  Active Problems:    Small bowel obstruction (HCC)  Resolved Problems:    * No resolved hospital problems. *       Problems and results from this hospitalization that need follow up.  Follow up with clinic at [facility]    Significant test results and incidental findings.  MPRESSION: CT abd pelvis 9/23/24   Mild circumferential mucosal thickening involving several loops of distal  small bowel along the lower pelvis which could be due to focal ileitis with  minimal surrounding inflammation and fluid in the area.  This appears to be  causing a mild partial small bowel obstruction.  Recommend surgical follow-up     Contracted gallbladder with no gallstones.     Mild chronic liver changes with fatty replacement throughout.     Atrophic uterus and mild free fluid in the cul-de-sac with no obvious abscess  or pelvic mass and a normal appendix.     SBFT 9/24/24      IMPRESSION:  Unremarkable small bowel follow through series.       Invasive procedures and treatments.   None     Problem-based Hospital Course.  The patient sought care in the ED due to abd pain.  CT suggested partial SBO.  She was admitted and followed by GS and given supportive care with IV hydration and analgesics.  SBFT was later completed and was normal.  Her pain  was much improved and she was tolerating a diet at the time of d/c home.  Her was daughter was present during her hospital stay and assisted with her translation needs.    She was d/c on 5 days of antibiotics for possible enteritis.  Scripts were filled for free in the outpatient pharmacy.      She was eager to be released.     Consults.  IP CONSULT TO HOSPITALIST  IP CONSULT TO GENERAL SURGERY  IP CONSULT TO DIETITIAN    Physical examination on discharge day.   BP (!) 147/79   Pulse 86   Temp 97.7 °F (36.5 °C) (Oral)   Resp 16   Ht 1.549 m (5' 0.98\")   Wt 61.2 kg (135 lb)   SpO2 95%   BMI 25.52 kg/m²   General appearance.  Alert. Looks comfortable.  HEENT. Sclera clear. Moist mucus membranes.  Cardiovascular. Regular rate and rhythm, normal S1, S2. No murmur.   Respiratory. Not using accessory muscles.Clear to auscultation bilaterally, no wheeze.  Gastrointestinal. Abdomen soft, non-tender, not distended, normal bowel sounds  Neurology. Facial symmetry. No speech deficits. Moving all extremities equally.  Extremities. No edema in lower extremities.  Skin. Warm, dry, normal turgor    Condition at time of discharge stable     Medication instructions provided to patient at discharge.     Medication List        START taking these medications      ciprofloxacin 500 MG tablet  Commonly known as: CIPRO  Take 1 tablet by mouth 2 times daily for 5 days     metroNIDAZOLE 500 MG tablet  Commonly known as: FLAGYL  Take 1 tablet by mouth 3 times daily for 5 days            CONTINUE taking these medications      olopatadine 0.2 % Soln ophthalmic solution  Commonly known as: PATADAY            STOP taking these medications      cyclobenzaprine 10 MG tablet  Commonly known as: FLEXERIL     naproxen sodium 220 MG tablet  Commonly known as: ANAPROX            ASK your doctor about these medications      estradiol 0.1 MG/GM vaginal cream  Commonly known as: ESTRACE               Where to Get Your Medications        These

## 2024-10-22 NOTE — PROGRESS NOTES
[facility]    9/24/2024 7:31 AM        Name: Tiana Silva .              Admitted: 9/23/2024  Primary Care Provider: No primary care provider on file. (Tel: None)      Subjective:  .    Seen this am with daughter at bedside who acts at interpretor ( patient /family decline interpretor services)     Pt reports level 6 abd pain and headache.   NG is also uncomfortable.   We discussed the need for ambulation   No previous hx of abd surgeries etc.   Imaging and labs reviewed with pt and daughter     Reviewed interval ancillary notes    Current Medications  sodium chloride flush 0.9 % injection 5-40 mL, 2 times per day  sodium chloride flush 0.9 % injection 5-40 mL, PRN  0.9 % sodium chloride infusion, PRN  potassium chloride (KLOR-CON M) extended release tablet 40 mEq, PRN   Or  potassium bicarb-citric acid (EFFER-K) effervescent tablet 40 mEq, PRN   Or  potassium chloride 10 mEq/100 mL IVPB (Peripheral Line), PRN  magnesium sulfate 2000 mg in 50 mL IVPB premix, PRN  enoxaparin (LOVENOX) injection 40 mg, Daily  ondansetron (ZOFRAN-ODT) disintegrating tablet 4 mg, Q8H PRN   Or  ondansetron (ZOFRAN) injection 4 mg, Q6H PRN  polyethylene glycol (GLYCOLAX) packet 17 g, Daily PRN  acetaminophen (TYLENOL) tablet 650 mg, Q6H PRN   Or  acetaminophen (TYLENOL) suppository 650 mg, Q6H PRN  0.9 % sodium chloride infusion, Continuous  morphine (PF) injection 2 mg, Q4H PRN        Objective:  /77   Pulse 86   Temp 97.6 °F (36.4 °C) (Axillary)   Resp 16   Ht 1.549 m (5' 1\")   Wt 61.2 kg (135 lb)   SpO2 95%   BMI 25.51 kg/m²     Intake/Output Summary (Last 24 hours) at 9/24/2024 0731  Last data filed at 9/23/2024 2250  Gross per 24 hour   Intake 1000 ml   Output --   Net 1000 ml      Wt Readings from Last 3 Encounters:   09/23/24 61.2 kg (135 lb)       General appearance:  Appears uncomfortable,  she is pleasant and cooperative   Eyes: Sclera clear. Pupils equal.  ENT: Moist 
  Physician Progress Note      PATIENT:               NATASHA PARIKH  [facility] #:                  [id number]  :                       1970  ADMIT DATE:       2024 9:44 PM  DISCH DATE:        2024 11:46 AM  RESPONDING  PROVIDER #:        ISAIAH Campa CNP          QUERY TEXT:    Pt admitted - with partial SBO and has possible enteritis documented.   If possible, please document the type of colitis in the medical record.    The medical record reflects the following:  Risk Factors: 54 year old female  Clinical Indicators:  DC Summary: \"She was d/c on 5 days of antibiotics for possible enteritis.\"  General Surgery : \"Abdominal pain. Partial small bowel obstruction.   Enteritis.\"    Treatment: IV Levofloxacin x1, IV Flagyl, discharged on PO Flagyl and Cipro   for 5 days  Options provided:  -- This patient was treated for suspected bacterial enteritis.  -- Other - I will add my own diagnosis  -- Disagree - Not applicable / Not valid  -- Disagree - Clinically unable to determine / Unknown  -- Refer to Clinical Documentation Reviewer    PROVIDER RESPONSE TEXT:    This patient was treated for suspected bacterial enteritis.    Query created by: Jennifer Guerrero on 10/8/2024 11:29 AM      Electronically signed by:  ISAIAH Campa CNP 10/22/2024 3:22   PM          
4 Eyes Skin Assessment     NAME:  Tiana Silva  YOB: 1970  MEDICAL RECORD NUMBER:  [id number]    The patient is being assess for  Admission    I agree that 2 RN's have performed a thorough Head to Toe Skin Assessment on the patient. ALL assessment sites listed below have been assessed.      Areas assessed by both nurses:    Head, Face, Ears, Shoulders, Back, Chest, Arms, Elbows, Hands, Sacrum. Buttock, Coccyx, Ischium, and Legs. Feet and Heels        Does the Patient have a Wound? No noted wound(s)       Pierre Prevention initiated:  NA   Wound Care Orders initiated:  NA    Pressure Injury (Stage 3,4, Unstageable, DTI, NWPT, and Complex wounds) if present place consult order under :: NA    New and Established Ostomies if present place consult order under : NA      Nurse 1 eSignature: Electronically signed by Sally Nickerson RN on 9/24/24 at 3:25 AM EDT    **SHARE this note so that the co-signing nurse is able to place an eSignature**    Nurse 2 eSignature: Electronically signed by Angela Ochoa RN on 9/24/24 at 6:43 AM EDT        
Admission and assessment completed. VSS. Patient awake, alert, and in bed. Fluids initiated. NG in place and verified by XR. Orders placed for low-intermittent suction per NP. Patient c/o abd pain and nausea but refusing medication at this time. She does not like the way it makes her feel. Ice chips given per diet order. Safety precautions in place. Daughter at bedside. Call light within reach. Will continue to monitor.    
CLINICAL PHARMACY NOTE: MEDS TO BEDS    Total # of Prescriptions Filled: 2   The following medications were delivered to the patient:  METRONIDAZOLE 500MG TABS  CIPROFLOXACIN HCL 500MG TABS    Additional Documentation: [employer] ROCHA approved to deliver medications to patient room=signed  Madel[facility] Tech  
Discharge instructions given, all questions ansnwered. 2 prescriptions filled and in hand with explanation. Patient and family verbalize understanding. IV removed with tip intact.  Love Hackett RN    
ED TO INPATIENT SBAR HANDOFF    Patient Name: Tiana Silva   :  1970  54 y.o.   MRN:  [id number]  Preferred Name    ED Room #:  ED-0015/15  Family/Caregiver Present no   Restraints no   Sitter no   Sepsis Risk Score      Situation  Code Status: No Order No additional code details.    Allergies: Patient has no known allergies.  Weight: Patient Vitals for the past 96 hrs (Last 3 readings):   Weight   24 2148 61.2 kg (135 lb)     Arrived from: home  Chief Complaint:   Chief Complaint   Patient presents with    Abdominal Pain     Pt presents with abdominla pain that begna this morning the pain has gotten worse and 1 hour ago the pain became unbearable. Pt states she has diarrhea but denies n/v     Hospital Problem/Diagnosis:  Principal Problem:    Abdominal pain  Resolved Problems:    * No resolved hospital problems. *    Imaging:   CT ABDOMEN PELVIS W IV CONTRAST Additional Contrast? None   Final Result   Mild circumferential mucosal thickening involving several loops of distal   small bowel along the lower pelvis which could be due to focal ileitis with   minimal surrounding inflammation and fluid in the area.  This appears to be   causing a mild partial small bowel obstruction.  Recommend surgical follow-up      Contracted gallbladder with no gallstones.      Mild chronic liver changes with fatty replacement throughout.      Atrophic uterus and mild free fluid in the cul-de-sac with no obvious abscess   or pelvic mass and a normal appendix.         XR ABDOMEN FOR NG/OG/NE TUBE PLACEMENT    (Results Pending)     Abnormal labs:   Abnormal Labs Reviewed   CBC WITH AUTO DIFFERENTIAL - Abnormal; Notable for the following components:       Result Value    Lymphocytes Absolute 5.4 (*)     All other components within normal limits   COMPREHENSIVE METABOLIC PANEL - Abnormal; Notable for the following components:    Glucose 125 (*)     Creatinine <0.5 (*)     All other components within normal limits 
How does patient ambulate?   [x]Low Fall Risk (ambulates by themselves without support)  []Stand by assist   []Contact Guard   []Front wheel walker  []Wheelchair   []Steady  []Bed bound  []History of Lower Extremity Amputation  []Unknown, did not assess in the emergency department   How does patient take pills?  [x]Whole with Water  []Crushed in applesauce  []Crushed in pudding  []Other  []Unknown no oral medications were given in the ED  Is patient alert?   [x]Alert  []Drowsy but responds to voice  []Doesn't respond to voice but responds to painful stimuli  []Unresponsive  Is patient oriented?   [x]To person  [x]To place  [x]To time  []To situation  []Confused  []Agitated  []Follows commands  If patient is disoriented or from a Skill Nursing Facility has family been notified of admission?   []Yes   [x]No  Patient belongings?   []Cell phone  []Wallet   []Dentures  [x]Clothing  Any specific patient or family belongings/needs/dynamics?     Miscellaneous comments/pending orders?     If there are any additional questions please reach out to the Emergency Department.         
Ng placed and xray for placement ordered  
Pt disconnected from ed monitoring system. Report given to 4T rn. All question answered. All belongings with pt.   
Shift assessment complete at 1957.  VSS, A&Ox4, BS active.  Patient reports no stomach cramps, diarrhea, nausea at this time, but is experiencing a slight headache.  Patient and family educated about using hat to get a stool sample but pt has not had a BM since yesterday.  Care plan discussed, patient mutually agrees.  Call light within reach, no further needs at this time.    0402: tylenol given for headache, see DANIA Thao RN    
Spoke with Nely from surgery, okay to be discharged and only follow up if needed.  Love Hackett RN    
    sodium chloride 75 mL/hr at 09/24/24 2004     PRN Meds:.sodium chloride flush, sodium chloride, potassium chloride **OR** potassium alternative oral replacement **OR** potassium chloride, magnesium sulfate, ondansetron **OR** ondansetron, polyethylene glycol, acetaminophen **OR** acetaminophen, morphine      Assessment:  54 y.o. female admitted with   1. Small bowel obstruction (HCC)    2. Ileitis        Abdominal pain  Partial small bowel obstruction  Enteritis       Plan:  1. Pain resolved, benign abdominal exam, no nausea or vomiting, tolerating clear liquid diet, passing flatus and stool, vitals/labs stable, small bowel follow through unremarkable; continued supportive care, no further imaging or intervention planned  2. Advance to regular diet as tolerated; monitor bowel function  3. Stop IV hydration; monitor and correct electrolytes  4. Antibiotics, switch to PO at discharge  5. Activity as tolerated  6. PRN analgesics and antiemetics--minimizing narcotics as tolerated  7. Management of medical comorbid etiologies per primary team and consulting services  8. Disposition: Okay for discharge home today if tolerating diet; follow up with Dr. Jimenes as needed    EDUCATION:  Educated patient on plan of care and disease process--all questions answered.    Plans discussed with patient and nursing.  Reviewed and discussed with Dr. Jimenes.      Signed:  PRIMITIVO Landeros CNP  9/25/2024 11:29 AM    Surgery Staff:     I have personally performed a face to face diagnostic evaluation on this patient. I have interviewed and examined the patient and I agree with the assessment above. Time was spent reviewing patient chart (including but not limited to notes, labs, imaging and other testing), interviewing and counseling patient and present family members, performing physical exam, documentation of my findings and subsequent follow up of ordered medication and testing, placing referrals and communication with